# Patient Record
Sex: FEMALE | Race: WHITE | URBAN - METROPOLITAN AREA
[De-identification: names, ages, dates, MRNs, and addresses within clinical notes are randomized per-mention and may not be internally consistent; named-entity substitution may affect disease eponyms.]

---

## 2023-04-28 ENCOUNTER — OFFICE VISIT (OUTPATIENT)
Dept: URBAN - METROPOLITAN AREA CLINIC 51 | Facility: CLINIC | Age: 83
End: 2023-04-28
Payer: MEDICARE

## 2023-04-28 DIAGNOSIS — H35.363 DRUSEN (DEGENERATIVE) OF MACULA, BILATERAL: ICD-10-CM

## 2023-04-28 DIAGNOSIS — H40.1134 PRIMARY OPEN-ANGLE GLAUCOMA, INDETERMINATE, BILATERAL: Primary | ICD-10-CM

## 2023-04-28 DIAGNOSIS — H43.313 VITREOUS MEMBRANES AND STRANDS, BILATERAL: ICD-10-CM

## 2023-04-28 DIAGNOSIS — E11.9 TYPE 2 DIABETES MELLITUS WITHOUT COMPLICATIONS: ICD-10-CM

## 2023-04-28 DIAGNOSIS — Z79.84 LONG TERM (CURRENT) USE OF ORAL ANTIDIABETIC DRUGS: ICD-10-CM

## 2023-04-28 DIAGNOSIS — H26.492 OTHER SECONDARY CATARACT, LEFT EYE: ICD-10-CM

## 2023-04-28 DIAGNOSIS — H25.811 COMBINED FORMS OF AGE-RELATED CATARACT, RIGHT EYE: ICD-10-CM

## 2023-04-28 PROCEDURE — 99204 OFFICE O/P NEW MOD 45 MIN: CPT | Performed by: OPTOMETRIST

## 2023-04-28 PROCEDURE — 92133 CPTRZD OPH DX IMG PST SGM ON: CPT | Performed by: OPTOMETRIST

## 2023-04-28 PROCEDURE — 92134 CPTRZ OPH DX IMG PST SGM RTA: CPT | Performed by: OPTOMETRIST

## 2023-04-28 PROCEDURE — 92250 FUNDUS PHOTOGRAPHY W/I&R: CPT | Performed by: OPTOMETRIST

## 2023-04-28 RX ORDER — TRAVOPROST 0.04 MG/ML
0.004 % SOLUTION/ DROPS OPHTHALMIC
Qty: 1 | Refills: 3 | Status: ACTIVE
Start: 2023-04-28

## 2023-04-28 ASSESSMENT — VISUAL ACUITY
OS: 20/30
OD: 20/50

## 2023-04-28 ASSESSMENT — INTRAOCULAR PRESSURE
OD: 19
OS: 17

## 2023-04-28 NOTE — IMPRESSION/PLAN
Impression: Type 2 diabetes mellitus without complications: E64.0. Plan: No diabetic retinopathy observed on today's examination. Discussed the importance of annual diabetic eye exams. Discussed with patient importance of good blood sugar control. Recommend continue f/u care for DM control with PCP. Send report to PCP. Monitor annually with MAC OCT, FUNDUS PHOTOS.

## 2023-04-28 NOTE — IMPRESSION/PLAN
Impression: RPE changes of macula, bilateral: H35.363. Plan: Bilateral RPE changes. Cystic space noted OD. MAC OCT taken today. Monitor with MAC OCT.

## 2023-04-28 NOTE — IMPRESSION/PLAN
Impression: Combined forms of age-related cataract, right eye: H25.811. Plan: Cataracts are visually significant, however, not bothersome enough to patient to warrant surgical intervention at this time. Will plan to monitor annually, sooner if patient notices changes/ worsening in vision.

## 2023-04-28 NOTE — IMPRESSION/PLAN
Impression: Vitreous membranes and strands, bilateral: H43.313. *PVD OD  Plan: Retina is attached 360 degrees with no signs of any retinal holes, tears, or detachments observed on today's dilated examination. Patient to RTC ASAP if increase in floaters, flashes, or curtain or veil taking away vision.

## 2023-04-28 NOTE — IMPRESSION/PLAN
Impression: Primary open-angle glaucoma, indeterminate, bilateral: H40.5491. *History of Travatan use OS ; d/c following stent procedure Plan: Asymmetric ONH appearance OD>OS
IOPs today: 19/17 on Combigan BID OU and Travatan QHS OS 
RNFL OCT (04/28/2023): inferior > superior abnormal OU
[facility] OCT (04/28/2023): abnormal OU Reviewed today's findings with patient. Will need to bring back for baseline HVF to determine severity of glaucoma due to no referral or previous notes. Recommend bringing back in 2 months for VF and follow up, patient unsure if will have transportation to office. Confusion on eye drops. First stated Combigan only OD BID; then Combigan BID OD, Travatan qhs OS; then Combigan BID OU and Travatan QHS OS. Use Combigan bid OU, Travatan qhs OS -- can modify further based on HVF. RTC in 2 months for 24-2c HVF + IOP check

## 2023-04-28 NOTE — IMPRESSION/PLAN
Impression: Other secondary cataract, left eye: H26.492. Plan: Opacified capsule is not visually significant enough to warrant laser treatment at this time. Can discuss laser treatment in the future if vision worsens. Patient will monitor vision closely and contact our office with any changes/ worsening of vision. Will re-evaluate on return visit.